# Patient Record
Sex: FEMALE | Race: WHITE | Employment: OTHER | URBAN - METROPOLITAN AREA
[De-identification: names, ages, dates, MRNs, and addresses within clinical notes are randomized per-mention and may not be internally consistent; named-entity substitution may affect disease eponyms.]

---

## 2021-09-22 ENCOUNTER — TRANSFERRED RECORDS (OUTPATIENT)
Dept: PHYSICAL THERAPY | Facility: CLINIC | Age: 76
End: 2021-09-22

## 2021-10-05 ENCOUNTER — THERAPY VISIT (OUTPATIENT)
Dept: PHYSICAL THERAPY | Facility: CLINIC | Age: 76
End: 2021-10-05
Payer: COMMERCIAL

## 2021-10-05 DIAGNOSIS — M54.42 LEFT-SIDED LOW BACK PAIN WITH LEFT-SIDED SCIATICA: ICD-10-CM

## 2021-10-05 PROCEDURE — 97140 MANUAL THERAPY 1/> REGIONS: CPT | Mod: GP | Performed by: PHYSICAL THERAPIST

## 2021-10-05 PROCEDURE — 97110 THERAPEUTIC EXERCISES: CPT | Mod: GP | Performed by: PHYSICAL THERAPIST

## 2021-10-05 PROCEDURE — 97161 PT EVAL LOW COMPLEX 20 MIN: CPT | Mod: GP | Performed by: PHYSICAL THERAPIST

## 2021-10-05 PROCEDURE — 97112 NEUROMUSCULAR REEDUCATION: CPT | Mod: GP | Performed by: PHYSICAL THERAPIST

## 2021-10-05 NOTE — LETTER
[facility]  [address]  [place] [address]  [phone]    2021    Re: Gloria Chaudhari   :   1945  MRN:  [id number]   REFERRING PHYSICIAN:   Jm BETTS [facility]  Date of Initial Evaluation:  10/5/21  Visits:  Rxs Used: 1  Reason for Referral:  Left-sided low back pain with left-sided sciatica    EVALUATION SUMMARY    Physical Therapy Initial Evaluation  Subjective:  The history is provided by the patient. History limited by: slow response per patient (potentially due to stroke?)  Significant increased time for eval and patient to respond to questions. No  was used.   Patient Health History  Gloria Chaudhari being seen for L sciatica.     Problem began: 2021.   Problem occurred: 2 months ago woke up with L buttock pain, slight LBP, posterior thigh and back of L knee pain   Pain is reported as 7/10 on pain scale.  General health as reported by patient is good.  Pertinent medical history includes: osteoporosis, cancer and numbness/tingling (CVA about 3 years ago with residual L LE weakness and tremor L arm. uses quad cane on stairs).   Red flags:  Pain at rest/night.  Surgeries include:  Cancer surgery. Other surgery history details: breast cancer.    Current medications:  Pain medication and high blood pressure medication (tylenol).    Current occupation is retired.           Therapist Generated HPI Evaluation  Affected Side: L buttock and leg.  This is a new condition.  Patient reports pain:  Lumbar spine left.  Pain is described as aching and is intermittent.  Pain radiates to:  Gluteals left, thigh left and knee left (L posterior thigh, posterior knee, only tingling to L foot). Pain is the same all the time.  Since onset symptoms are gradually worsening.  Associated symptoms:  Tingling (tingling into L foot (started awhile ago- before leg pain).   missed a step and fell, got up and fell again). Symptoms are exacerbated by walking and sitting (awakes at night due to pain, ambulate 4 blocks, sitting sometimes immediate pain)  and relieved by analgesics (tylenol, lie down on back).  Imaging testing: none.  Barriers include:  Stairs (lives with , stairs to basement).  Re: Gloria Chaudhari   :   1945  Objective:  Standing Alignment:    General deviations alignment: stands with weight onto R LE.  Gait:  Hemiparetic gait L LE  Gait Type:  Antalgic   Weight Bearing Status:  WBAT   Assistive Devices:  None  Deviations:  General Deviations:  Radha decr and stride length decr       Lumbar/SI Evaluation  ROM:    AROM Lumbar:   Flexion:          Mod loss strain to posterior thigh remains  Ext:                    Max loss NE on pain   Side Glide:        Left:  Min loss NE/NE    Right:  Min loss, lessens thigh pain, more pain to buttock  Lumbar Myotomes:    T12-L3 (Hip Flex):  Left: 5-    Right: 5  L2-4 (Quads):  Left:  4+    Right:  5  L4 (Ankle DF):  Left:  4+    Right:  5-  L5 (Great Toe Ext): Left: 4+    Right: 5-   S1 (Toe Raise):  Left: 5    Right: 5  Lumbar Dermtomes:  normal  Neural Tension/Mobility:    Left side:Slump (same stretch on L head up vs down)  negative.   Right side:   Slump positive.    Michelle Lumbar Evaluation  Posture:  Sitting: poor  Standing: poor  Correction of Posture: worse  Other Observations: slouch over-correct NE/NE    Assessment/Plan:    Patient is a 75 year old female with lumbar complaints.    Patient has the following significant findings with corresponding treatment plan.                Diagnosis 1:   L sciatica    Pain -  manual therapy, splint/taping/bracing/orthotics, education and home program  Decreased ROM/flexibility - manual therapy, therapeutic exercise, therapeutic activity and home program  Decreased strength - therapeutic exercise, therapeutic activities and home program  Decreased proprioception - neuro  re-education, gait training, therapeutic activities and home program  Impaired gait - gait training, assistive devices and home program  Decreased function - therapeutic activities and home program  Impaired posture - neuro re-education, therapeutic activities and home program    Therapy Evaluation Codes:   Cumulative Therapy Evaluation is: Low complexity.    Previous and current functional limitations:  (See Goal Flow Sheet for this information)    Short term and Long term goals: (See Goal Flow Sheet for this information)     Re: Gloria Chaudhari   :   1945    Communication ability:  Patient appears to be able to clearly communicate and understand verbal and written communication and follow directions correctly.  Treatment Explanation - The following has been discussed with the patient:     RX ordered/plan of care  Anticipated outcomes  Possible risks and side effects  This patient would benefit from PT intervention to resume normal activities.   Rehab potential is good.    Frequency:  1 X week, once daily  Duration:  for 10 weeks  Discharge Plan:  Achieve all LTG.  Independent in home treatment program.  Reach maximal therapeutic benefit.        Thank you for your referral.    INQUIRIES  Therapist: Traci Henson PT  [facility] SERVICES [address] [address]  Phone: [phone]  Fax: [phone]

## 2021-10-05 NOTE — PROGRESS NOTES
Physical Therapy Initial Evaluation  Subjective:  The history is provided by the patient. History limited by: slow response per patient (potentially due to stroke?)  Significant increased time for eval and patient to respond to questions. No  was used.   Patient Health History  Gloria Chaudhari being seen for L sciatica.     Problem began: 9/22/2021.   Problem occurred: 2 months ago woke up with L buttock pain, slight LBP, posterior thigh and back of L knee pain   Pain is reported as 7/10 on pain scale.  General health as reported by patient is good.  Pertinent medical history includes: osteoporosis, cancer and numbness/tingling (CVA about 3 years ago with residual L LE weakness and tremor L arm. uses quad cane on stairs).   Red flags:  Pain at rest/night.     Surgeries include:  Cancer surgery. Other surgery history details: breast cancer.    Current medications:  Pain medication and high blood pressure medication (tylenol).    Current occupation is retired.                     Therapist Generated HPI Evaluation         Affected Side: L buttock and leg.    This is a new condition.      Patient reports pain:  Lumbar spine left.  Pain is described as aching and is intermittent.  Pain radiates to:  Gluteals left, thigh left and knee left (L posterior thigh, posterior knee, only tingling to L foot). Pain is the same all the time.  Since onset symptoms are gradually worsening.  Associated symptoms:  Tingling (tingling into L foot (started awhile ago- before leg pain).  missed a step and fell, got up and fell again). Symptoms are exacerbated by walking and sitting (awakes at night due to pain, ambulate 4 blocks, sitting sometimes immediate pain)  and relieved by analgesics (tylenol, lie down on back).  Imaging testing: none.    Barriers include:  Stairs (lives with , stairs to basement).                        Objective:  Standing Alignment:                  General deviations alignment: stands  with weight onto R LE.  Gait:  Hemiparetic gait L LE    Gait Type:  Antalgic   Weight Bearing Status:  WBAT   Assistive Devices:  None  Deviations:  General Deviations:  Radha decr and stride length decr               Lumbar/SI Evaluation  ROM:    AROM Lumbar:   Flexion:          Mod loss strain to posterior thigh remains  Ext:                    Max loss NE on pain   Side Bend:        Left:     Right:   Rotation:           Left:     Right:   Side Glide:        Left:  Min loss NE/NE    Right:  Min loss, lessens thigh pain, more pain to buttock          Lumbar Myotomes:    T12-L3 (Hip Flex):  Left: 5-    Right: 5  L2-4 (Quads):  Left:  4+    Right:  5  L4 (Ankle DF):  Left:  4+    Right:  5-  L5 (Great Toe Ext): Left: 4+    Right: 5-   S1 (Toe Raise):  Left: 5    Right: 5      Lumbar Dermtomes:  normal                Neural Tension/Mobility:      Left side:Slump (same stretch on L head up vs down)  negative.   Right side:   Slump positive.  Lumbar Palpation:    Tenderness present at Left:    Piriformis                                                         Michelle Lumbar Evaluation    Posture:  Sitting: poor  Standing: poor  Lordosis: WNL  Lateral Shift: no  Correction of Posture: worse  Other Observations: slouch over-correct NE/NE    Test Movements:    EIS: During: no effect  After: no effect    Repeat EIS: During: no effect  After: no effect  Mechanical Response: no effect                                                     ROS    Assessment/Plan:    Patient is a 75 year old female with lumbar complaints.    Patient has the following significant findings with corresponding treatment plan.                Diagnosis 1:   L sciatica    Pain -  manual therapy, splint/taping/bracing/orthotics, education and home program  Decreased ROM/flexibility - manual therapy, therapeutic exercise, therapeutic activity and home program  Decreased strength - therapeutic exercise, therapeutic activities and home program  Decreased  proprioception - neuro re-education, gait training, therapeutic activities and home program  Impaired gait - gait training, assistive devices and home program  Decreased function - therapeutic activities and home program  Impaired posture - neuro re-education, therapeutic activities and home program    Therapy Evaluation Codes:   Cumulative Therapy Evaluation is: Low complexity.    Previous and current functional limitations:  (See Goal Flow Sheet for this information)    Short term and Long term goals: (See Goal Flow Sheet for this information)     Communication ability:  Patient appears to be able to clearly communicate and understand verbal and written communication and follow directions correctly.  Treatment Explanation - The following has been discussed with the patient:   RX ordered/plan of care  Anticipated outcomes  Possible risks and side effects  This patient would benefit from PT intervention to resume normal activities.   Rehab potential is good.    Frequency:  1 X week, once daily  Duration:  for 10 weeks  Discharge Plan:  Achieve all LTG.  Independent in home treatment program.  Reach maximal therapeutic benefit.    Please refer to the daily flowsheet for treatment today, total treatment time and time spent performing 1:1 timed codes.

## 2021-10-12 ENCOUNTER — THERAPY VISIT (OUTPATIENT)
Dept: PHYSICAL THERAPY | Facility: CLINIC | Age: 76
End: 2021-10-12
Payer: COMMERCIAL

## 2021-10-12 DIAGNOSIS — M54.42 LEFT-SIDED LOW BACK PAIN WITH LEFT-SIDED SCIATICA: ICD-10-CM

## 2021-10-12 PROCEDURE — 97112 NEUROMUSCULAR REEDUCATION: CPT | Mod: GP | Performed by: PHYSICAL THERAPIST

## 2021-10-12 PROCEDURE — 97110 THERAPEUTIC EXERCISES: CPT | Mod: GP | Performed by: PHYSICAL THERAPIST

## 2021-10-26 ENCOUNTER — THERAPY VISIT (OUTPATIENT)
Dept: PHYSICAL THERAPY | Facility: CLINIC | Age: 76
End: 2021-10-26
Payer: COMMERCIAL

## 2021-10-26 DIAGNOSIS — M54.42 LEFT-SIDED LOW BACK PAIN WITH LEFT-SIDED SCIATICA: ICD-10-CM

## 2021-10-26 PROCEDURE — 97110 THERAPEUTIC EXERCISES: CPT | Mod: GP | Performed by: PHYSICAL THERAPIST

## 2021-10-26 PROCEDURE — 97112 NEUROMUSCULAR REEDUCATION: CPT | Mod: GP | Performed by: PHYSICAL THERAPIST

## 2021-11-02 ENCOUNTER — THERAPY VISIT (OUTPATIENT)
Dept: PHYSICAL THERAPY | Facility: CLINIC | Age: 76
End: 2021-11-02
Payer: COMMERCIAL

## 2021-11-02 DIAGNOSIS — M54.42 LEFT-SIDED LOW BACK PAIN WITH LEFT-SIDED SCIATICA: ICD-10-CM

## 2021-11-02 PROCEDURE — 97110 THERAPEUTIC EXERCISES: CPT | Mod: GP | Performed by: PHYSICAL THERAPIST

## 2021-11-02 PROCEDURE — 97140 MANUAL THERAPY 1/> REGIONS: CPT | Mod: GP | Performed by: PHYSICAL THERAPIST

## 2021-11-08 ENCOUNTER — THERAPY VISIT (OUTPATIENT)
Dept: PHYSICAL THERAPY | Facility: CLINIC | Age: 76
End: 2021-11-08
Payer: COMMERCIAL

## 2021-11-08 DIAGNOSIS — M54.42 LEFT-SIDED LOW BACK PAIN WITH LEFT-SIDED SCIATICA: ICD-10-CM

## 2021-11-08 PROCEDURE — 97140 MANUAL THERAPY 1/> REGIONS: CPT | Mod: GP | Performed by: PHYSICAL THERAPIST

## 2021-11-08 PROCEDURE — 97110 THERAPEUTIC EXERCISES: CPT | Mod: GP | Performed by: PHYSICAL THERAPIST

## 2021-11-15 ENCOUNTER — THERAPY VISIT (OUTPATIENT)
Dept: PHYSICAL THERAPY | Facility: CLINIC | Age: 76
End: 2021-11-15
Payer: COMMERCIAL

## 2021-11-15 DIAGNOSIS — M54.42 LEFT-SIDED LOW BACK PAIN WITH LEFT-SIDED SCIATICA: ICD-10-CM

## 2021-11-15 PROCEDURE — 97110 THERAPEUTIC EXERCISES: CPT | Mod: GP | Performed by: PHYSICAL THERAPIST

## 2021-11-15 PROCEDURE — 97112 NEUROMUSCULAR REEDUCATION: CPT | Mod: GP | Performed by: PHYSICAL THERAPIST

## 2021-11-29 ENCOUNTER — THERAPY VISIT (OUTPATIENT)
Dept: PHYSICAL THERAPY | Facility: CLINIC | Age: 76
End: 2021-11-29
Payer: COMMERCIAL

## 2021-11-29 DIAGNOSIS — M54.42 LEFT-SIDED LOW BACK PAIN WITH LEFT-SIDED SCIATICA: ICD-10-CM

## 2021-11-29 PROCEDURE — 97110 THERAPEUTIC EXERCISES: CPT | Mod: GP | Performed by: PHYSICAL THERAPIST

## 2021-11-29 PROCEDURE — 97112 NEUROMUSCULAR REEDUCATION: CPT | Mod: GP | Performed by: PHYSICAL THERAPIST

## 2021-12-06 ENCOUNTER — THERAPY VISIT (OUTPATIENT)
Dept: PHYSICAL THERAPY | Facility: CLINIC | Age: 76
End: 2021-12-06
Payer: COMMERCIAL

## 2021-12-06 DIAGNOSIS — M54.42 LEFT-SIDED LOW BACK PAIN WITH LEFT-SIDED SCIATICA: ICD-10-CM

## 2021-12-06 PROCEDURE — 97110 THERAPEUTIC EXERCISES: CPT | Mod: GP | Performed by: PHYSICAL THERAPIST

## 2021-12-06 PROCEDURE — 97112 NEUROMUSCULAR REEDUCATION: CPT | Mod: GP | Performed by: PHYSICAL THERAPIST

## 2021-12-06 NOTE — LETTER
[facility]  [address]  [place] [address]  [phone]    2021    Re: Gloria Chaudhari   :   1945  MRN:  [id number]   REFERRING PHYSICIAN:   Jm BETTS [facility]    Date of Initial Evaluation:  10/05/2021  Visits:  Rxs Used: 8  Reason for Referral:  Left-sided low back pain with left-sided sciatica    Assessment/Plan:    PROGRESS  REPORT    Progress reporting period is from 10/5/21 to 21, 8 visits.       SUBJECTIVE  Subjective changes noted by patient:  No longer having pain into left leg/thigh for past several weeks.  Does still get L LBP intermittently (still daily) and piriformis stretching helps to relieve it.  Also no longer gets tingling to L foot.  Does feel able to increase how much is doing home exercises.  Patient would like to continue therapy.     Current Pain level: 3/10.      Initial Pain level: 7/10.   Changes in function:  Yes (See Goal flowsheet attached for changes in current functional level)  Adverse reaction to treatment or activity: None    OBJECTIVE  Changes noted in objective findings:  Yes:  Fair to poor posture.  Over-correction and lumbar support feel good (patient has not been doing).    MMT hip flexion L 5-/5, R 5/5, knee ext L 4+/5, R 5/5, foot DF L 4/5, R 5/5, great toe ext 5/5 B, plantarflexion L 4+/5, R 5/5. MMT hip ABD L 3-/5, R 4-/5.    Normal dermatomes.    L leg pain has relieved with piriformis stretching- also helps lessen L LBP.    Focusing therapy on symptoms relief and patient getting into routine with exercises for strengthening, balance and fall prevention and prophylaxis.    SLS unable, hip drop L>R even with UE support.    Gait hemiparetic gait with quad cane in R hand.  Step to pattern on L.      ASSESSMENT/PLAN  Updated problem list and treatment plan: Diagnosis 1:  L sciatica  Pain -  manual therapy, self management,  education and home program  Decreased ROM/flexibility - manual therapy, therapeutic exercise, therapeutic activity and home program  Decreased strength - therapeutic exercise, therapeutic activities and home program  Impaired balance - neuro re-education, gait training, therapeutic activities, adaptive equipment/assistive device and home program  Decreased proprioception - neuro re-education, gait training, therapeutic activities and home program  Impaired gait - gait training, assistive devices and home program  Decreased function - therapeutic activities and home program  Impaired posture - neuro re-education, therapeutic activities and home program  STG/LTGs have been met or progress has been made towards goals:  Yes (See Goal flow sheet completed today.)  Assessment of Progress: The patient's condition is improving with less pain and improving strength.  Self Management Plans:  Patient has been instructed in a home treatment program.  Patient  has been instructed in self management of symptoms.  I have re-evaluated this patient and find that the nature, scope, duration and intensity of the therapy is appropriate for the medical condition of the patient.  Gloria continues to require the following intervention to meet STG and LTG's:  PT    Recommendations:  This patient would benefit from continued therapy.     Frequency:  2 X a month, once daily  Duration:  for 1 month per original 10 visits MD order      Thank you for your referral.    INQUIRIES  Therapist: Traci Henson DPT, Cert MDT, [facility] SERVICES [place]  [address] [address]  Phone: [phone]  Fax: [phone]

## 2021-12-06 NOTE — PROGRESS NOTES
Subjective:  HPI  Physical Exam                    Objective:  System    Physical Exam    General     ROS    Assessment/Plan:    PROGRESS  REPORT    Progress reporting period is from 10/5/21 to 12/6/21, 8 visits.       SUBJECTIVE  Subjective changes noted by patient:  No longer having pain into left leg/thigh for past several weeks.  Does still get L LBP intermittently (still daily) and piriformis stretching helps to relieve it.  Also no longer gets tingling to L foot.  Does feel able to increase how much is doing home exercises.  Patient would like to continue therapy.     Current Pain level: 3/10.      Initial Pain level: 7/10.   Changes in function:  Yes (See Goal flowsheet attached for changes in current functional level)  Adverse reaction to treatment or activity: None    OBJECTIVE  Changes noted in objective findings:  Yes:   Fair to poor posture.  Over-correction and lumbar support feel good (patient has not been doing).    MMT hip flexion L 5-/5, R 5/5, knee ext L 4+/5, R 5/5, foot DF L 4/5, R 5/5, great toe ext 5/5 B, plantarflexion L 4+/5, R 5/5. MMT hip ABD L 3-/5, R 4-/5.    Normal dermatomes.    L leg pain has relieved with piriformis stretching- also helps lessen L LBP.    Focusing therapy on symptoms relief and patient getting into routine with exercises for strengthening, balance and fall prevention and prophylaxis.    SLS unable, hip drop L>R even with UE support.    Gait hemiparetic gait with quad cane in R hand.  Step to pattern on L.      ASSESSMENT/PLAN  Updated problem list and treatment plan: Diagnosis 1:  L sciatica    Pain -  manual therapy, self management, education and home program  Decreased ROM/flexibility - manual therapy, therapeutic exercise, therapeutic activity and home program  Decreased strength - therapeutic exercise, therapeutic activities and home program  Impaired balance - neuro re-education, gait training, therapeutic activities, adaptive equipment/assistive device and home  program  Decreased proprioception - neuro re-education, gait training, therapeutic activities and home program  Impaired gait - gait training, assistive devices and home program  Decreased function - therapeutic activities and home program  Impaired posture - neuro re-education, therapeutic activities and home program  STG/LTGs have been met or progress has been made towards goals:  Yes (See Goal flow sheet completed today.)  Assessment of Progress: The patient's condition is improving with less pain and improving strength.  Self Management Plans:  Patient has been instructed in a home treatment program.  Patient  has been instructed in self management of symptoms.  I have re-evaluated this patient and find that the nature, scope, duration and intensity of the therapy is appropriate for the medical condition of the patient.  Gloria continues to require the following intervention to meet STG and LTG's:  PT    Recommendations:  This patient would benefit from continued therapy.     Frequency:  2 X a month, once daily  Duration:  for 1 month per original 10 visits MD order          Please refer to the daily flowsheet for treatment today, total treatment time and time spent performing 1:1 timed codes.

## 2021-12-21 ENCOUNTER — THERAPY VISIT (OUTPATIENT)
Dept: PHYSICAL THERAPY | Facility: CLINIC | Age: 76
End: 2021-12-21
Payer: COMMERCIAL

## 2021-12-21 DIAGNOSIS — M54.42 LEFT-SIDED LOW BACK PAIN WITH LEFT-SIDED SCIATICA: ICD-10-CM

## 2021-12-21 PROCEDURE — 97110 THERAPEUTIC EXERCISES: CPT | Mod: GP | Performed by: PHYSICAL THERAPIST

## 2021-12-21 PROCEDURE — 97112 NEUROMUSCULAR REEDUCATION: CPT | Mod: GP | Performed by: PHYSICAL THERAPIST

## 2021-12-28 ENCOUNTER — THERAPY VISIT (OUTPATIENT)
Dept: PHYSICAL THERAPY | Facility: CLINIC | Age: 76
End: 2021-12-28
Payer: COMMERCIAL

## 2021-12-28 DIAGNOSIS — M54.42 LEFT-SIDED LOW BACK PAIN WITH LEFT-SIDED SCIATICA: ICD-10-CM

## 2021-12-28 PROCEDURE — 97110 THERAPEUTIC EXERCISES: CPT | Mod: GP | Performed by: PHYSICAL THERAPIST

## 2021-12-28 PROCEDURE — 97112 NEUROMUSCULAR REEDUCATION: CPT | Mod: GP | Performed by: PHYSICAL THERAPIST

## 2021-12-28 NOTE — LETTER
[facility] [place]  [address]  [place] [address]  [phone]    2022    Re: Gloria Chaudhari   :   1945  MRN:  [id number]   REFERRING PHYSICIAN:   Jm BETTS [facility]  Date of Initial Evaluation:  10/5/21  Visits:  Rxs Used: 10  Reason for Referral:  Left-sided low back pain with left-sided sciatica    EVALUADISCHARGE REPORT  Progress reporting period is from 10/5/21 to 21, 10 visits.       SUBJECTIVE  Subjective changes noted by patient:  Patient feeling has low energy today and also thinks is depressed and meds not helping.  If does piriformis exercises has no pain.  If stops doing the exercises pain may return in L posterior thigh/buttock.  Patient did not bring cane today.    Current Pain level: 0/10.     Initial Pain level: 7/10.   Changes in function:  Yes (See Goal flowsheet attached for changes in current functional level)  Adverse reaction to treatment or activity: None    OBJECTIVE  Changes noted in objective findings:  Yes:   Fair to poor posture.    Over-correction and lumbar support feel good (patient has not been doing).    MMT hip flexion L 5-/5, R 5/5, knee ext B 5/5, foot DF L 4/5, R 5/5, great toe ext 5/5 B, plantarflexion L 4+/5, R 5/5.   MMT hip ABD L 3-/5, R 4-/5.    Normal dermatomes.    When keeps up with piriformis stretching has no pain.    Able to perform slow marching with hip drop, but not able to perform SLS.    Not therapist specialty, but patient's memory if factor in patient properly doing HEP.     ASSESSMENT/PLAN  Updated problem list and treatment plan: Diagnosis 1:  L sciatica    Pain -  home program  Decreased ROM/flexibility - home program  Decreased strength - home program  Decreased proprioception - home program  Impaired gait - home program  Decreased function - home program  Impaired posture - home program  STG/LTGs have been met or  progress has been made towards goals:  Yes (See Goal flow sheet completed today.)  Re: Gloria Chaudhari   :   1945    Assessment of Progress: The patient's condition is improving.  Patient responds very well to piriformis stretching to abolish L sciatica.  Patient went 2 full weeks without pain, then stopped doing exercise as much and mild pain returned.  When resume stretching pain resolves.  Continues to have weakness due to CVA, limited gait, but has exercise for strengthening and balance.  Self Management Plans:  Patient is independent in a home treatment program.  Patient is independent in self management of symptoms.  I have re-evaluated this patient and find that the nature, scope, duration and intensity of the therapy is appropriate for the medical condition of the patient.  Gloria continues to require the following intervention to meet STG and LTG's:  PT intervention is no longer required to meet STG/LTG.    Recommendations:  This patient is ready to be discharged from therapy and continue their home treatment program.      Thank you for your referral.    INQUIRIES  Therapist: Traci Henson PT  [facility] SERVICES [place] [id number] [address]  Phone: [phone]  Fax: [phone]

## 2021-12-29 NOTE — PROGRESS NOTES
Subjective:  HPI  Physical Exam                    Objective:  System    Physical Exam    General     ROS    Assessment/Plan:    DISCHARGE REPORT    Progress reporting period is from 10/5/21 to 12/28/21, 10 visits.       SUBJECTIVE  Subjective changes noted by patient:  Patient feeling has low energy today and also thinks is depressed and meds not helping.  If does piriformis exercises has no pain.  If stops doing the exercises pain may return in L posterior thigh/buttock.  Patient did not bring cane today.    Current Pain level: 0/10.     Initial Pain level: 7/10.   Changes in function:  Yes (See Goal flowsheet attached for changes in current functional level)  Adverse reaction to treatment or activity: None    OBJECTIVE  Changes noted in objective findings:  Yes:   Fair to poor posture.    Over-correction and lumbar support feel good (patient has not been doing).    MMT hip flexion L 5-/5, R 5/5, knee ext B 5/5, foot DF L 4/5, R 5/5, great toe ext 5/5 B, plantarflexion L 4+/5, R 5/5.   MMT hip ABD L 3-/5, R 4-/5.    Normal dermatomes.    When keeps up with piriformis stretching has no pain.    Able to perform slow marching with hip drop, but not able to perform SLS.    Not therapist specialty, but patient's memory if factor in patient properly doing HEP.     ASSESSMENT/PLAN  Updated problem list and treatment plan: Diagnosis 1:  L sciatica    Pain -  home program  Decreased ROM/flexibility - home program  Decreased strength - home program  Decreased proprioception - home program  Impaired gait - home program  Decreased function - home program  Impaired posture - home program  STG/LTGs have been met or progress has been made towards goals:  Yes (See Goal flow sheet completed today.)  Assessment of Progress: The patient's condition is improving.  Patient responds very well to piriformis stretching to abolish L sciatica.  Patient went 2 full weeks without pain, then stopped doing exercise as much and mild pain  returned.  When resume stretching pain resolves.  Continues to have weakness due to CVA, limited gait, but has exercise for strengthening and balance.  Self Management Plans:  Patient is independent in a home treatment program.  Patient is independent in self management of symptoms.  I have re-evaluated this patient and find that the nature, scope, duration and intensity of the therapy is appropriate for the medical condition of the patient.  Gloria continues to require the following intervention to meet STG and LTG's:  PT intervention is no longer required to meet STG/LTG.    Recommendations:  This patient is ready to be discharged from therapy and continue their home treatment program.    Please refer to the daily flowsheet for treatment today, total treatment time and time spent performing 1:1 timed codes.

## 2022-02-13 ENCOUNTER — HEALTH MAINTENANCE LETTER (OUTPATIENT)
Age: 77
End: 2022-02-13

## 2022-10-15 ENCOUNTER — HEALTH MAINTENANCE LETTER (OUTPATIENT)
Age: 77
End: 2022-10-15

## 2023-03-26 ENCOUNTER — HEALTH MAINTENANCE LETTER (OUTPATIENT)
Age: 78
End: 2023-03-26

## 2024-05-26 ENCOUNTER — HEALTH MAINTENANCE LETTER (OUTPATIENT)
Age: 79
End: 2024-05-26